# Patient Record
Sex: MALE | Race: BLACK OR AFRICAN AMERICAN | NOT HISPANIC OR LATINO | Employment: OTHER | URBAN - METROPOLITAN AREA
[De-identification: names, ages, dates, MRNs, and addresses within clinical notes are randomized per-mention and may not be internally consistent; named-entity substitution may affect disease eponyms.]

---

## 2023-11-12 ENCOUNTER — CLINICAL SUPPORT (OUTPATIENT)
Dept: EMERGENCY MEDICINE | Facility: HOSPITAL | Age: 58
End: 2023-11-12
Payer: COMMERCIAL

## 2023-11-12 ENCOUNTER — APPOINTMENT (OUTPATIENT)
Dept: RADIOLOGY | Facility: HOSPITAL | Age: 58
End: 2023-11-12
Payer: COMMERCIAL

## 2023-11-12 ENCOUNTER — HOSPITAL ENCOUNTER (EMERGENCY)
Facility: HOSPITAL | Age: 58
Discharge: HOME | End: 2023-11-12
Attending: EMERGENCY MEDICINE
Payer: COMMERCIAL

## 2023-11-12 VITALS
RESPIRATION RATE: 20 BRPM | OXYGEN SATURATION: 98 % | SYSTOLIC BLOOD PRESSURE: 118 MMHG | DIASTOLIC BLOOD PRESSURE: 81 MMHG | TEMPERATURE: 99 F | HEART RATE: 94 BPM

## 2023-11-12 DIAGNOSIS — R07.9 CHEST PAIN, UNSPECIFIED TYPE: Primary | ICD-10-CM

## 2023-11-12 LAB
ALBUMIN SERPL BCP-MCNC: 4 G/DL (ref 3.4–5)
ALP SERPL-CCNC: 71 U/L (ref 33–120)
ALT SERPL W P-5'-P-CCNC: 25 U/L (ref 10–52)
ANION GAP SERPL CALC-SCNC: 12 MMOL/L (ref 10–20)
AST SERPL W P-5'-P-CCNC: 24 U/L (ref 9–39)
ATRIAL RATE: 99 BPM
BASOPHILS # BLD AUTO: 0.02 X10*3/UL (ref 0–0.1)
BASOPHILS NFR BLD AUTO: 0.3 %
BILIRUB SERPL-MCNC: 0.7 MG/DL (ref 0–1.2)
BUN SERPL-MCNC: 17 MG/DL (ref 6–23)
CALCIUM SERPL-MCNC: 9.1 MG/DL (ref 8.6–10.6)
CARDIAC TROPONIN I PNL SERPL HS: 15 NG/L (ref 0–53)
CARDIAC TROPONIN I PNL SERPL HS: 19 NG/L (ref 0–53)
CHLORIDE SERPL-SCNC: 105 MMOL/L (ref 98–107)
CO2 SERPL-SCNC: 26 MMOL/L (ref 21–32)
CREAT SERPL-MCNC: 0.89 MG/DL (ref 0.5–1.3)
EOSINOPHIL # BLD AUTO: 0.03 X10*3/UL (ref 0–0.7)
EOSINOPHIL NFR BLD AUTO: 0.5 %
ERYTHROCYTE [DISTWIDTH] IN BLOOD BY AUTOMATED COUNT: 12.9 % (ref 11.5–14.5)
GFR SERPL CREATININE-BSD FRML MDRD: >90 ML/MIN/1.73M*2
GLUCOSE SERPL-MCNC: 93 MG/DL (ref 74–99)
HCT VFR BLD AUTO: 42.2 % (ref 41–52)
HGB BLD-MCNC: 14.1 G/DL (ref 13.5–17.5)
IMM GRANULOCYTES # BLD AUTO: 0.03 X10*3/UL (ref 0–0.7)
IMM GRANULOCYTES NFR BLD AUTO: 0.5 % (ref 0–0.9)
LIPASE SERPL-CCNC: 27 U/L (ref 9–82)
LYMPHOCYTES # BLD AUTO: 1.44 X10*3/UL (ref 1.2–4.8)
LYMPHOCYTES NFR BLD AUTO: 22.5 %
MCH RBC QN AUTO: 33.2 PG (ref 26–34)
MCHC RBC AUTO-ENTMCNC: 33.4 G/DL (ref 32–36)
MCV RBC AUTO: 99 FL (ref 80–100)
MONOCYTES # BLD AUTO: 0.5 X10*3/UL (ref 0.1–1)
MONOCYTES NFR BLD AUTO: 7.8 %
NEUTROPHILS # BLD AUTO: 4.37 X10*3/UL (ref 1.2–7.7)
NEUTROPHILS NFR BLD AUTO: 68.4 %
NRBC BLD-RTO: 0 /100 WBCS (ref 0–0)
P AXIS: 72 DEGREES
P OFFSET: 207 MS
P ONSET: 150 MS
PLATELET # BLD AUTO: 96 X10*3/UL (ref 150–450)
PLATELET CLUMP BLD QL SMEAR: PRESENT
POTASSIUM SERPL-SCNC: 3.8 MMOL/L (ref 3.5–5.3)
PR INTERVAL: 154 MS
PROT SERPL-MCNC: 7.1 G/DL (ref 6.4–8.2)
Q ONSET: 227 MS
QRS COUNT: 17 BEATS
QRS DURATION: 82 MS
QT INTERVAL: 346 MS
QTC CALCULATION(BAZETT): 444 MS
QTC FREDERICIA: 408 MS
R AXIS: -29 DEGREES
RBC # BLD AUTO: 4.25 X10*6/UL (ref 4.5–5.9)
RBC MORPH BLD: NORMAL
SODIUM SERPL-SCNC: 139 MMOL/L (ref 136–145)
T AXIS: -5 DEGREES
T OFFSET: 400 MS
VENTRICULAR RATE: 99 BPM
WBC # BLD AUTO: 6.4 X10*3/UL (ref 4.4–11.3)

## 2023-11-12 PROCEDURE — 80053 COMPREHEN METABOLIC PANEL: CPT

## 2023-11-12 PROCEDURE — 71046 X-RAY EXAM CHEST 2 VIEWS: CPT | Mod: FY

## 2023-11-12 PROCEDURE — 36415 COLL VENOUS BLD VENIPUNCTURE: CPT

## 2023-11-12 PROCEDURE — 36415 COLL VENOUS BLD VENIPUNCTURE: CPT | Performed by: EMERGENCY MEDICINE

## 2023-11-12 PROCEDURE — 83690 ASSAY OF LIPASE: CPT

## 2023-11-12 PROCEDURE — 99285 EMERGENCY DEPT VISIT HI MDM: CPT | Mod: 25 | Performed by: EMERGENCY MEDICINE

## 2023-11-12 PROCEDURE — 2500000001 HC RX 250 WO HCPCS SELF ADMINISTERED DRUGS (ALT 637 FOR MEDICARE OP): Mod: SE

## 2023-11-12 PROCEDURE — 94640 AIRWAY INHALATION TREATMENT: CPT

## 2023-11-12 PROCEDURE — 85025 COMPLETE CBC W/AUTO DIFF WBC: CPT

## 2023-11-12 PROCEDURE — 99283 EMERGENCY DEPT VISIT LOW MDM: CPT | Mod: 25

## 2023-11-12 PROCEDURE — 84484 ASSAY OF TROPONIN QUANT: CPT | Mod: 59 | Performed by: EMERGENCY MEDICINE

## 2023-11-12 PROCEDURE — 93005 ELECTROCARDIOGRAM TRACING: CPT

## 2023-11-12 PROCEDURE — 84484 ASSAY OF TROPONIN QUANT: CPT

## 2023-11-12 PROCEDURE — 2500000002 HC RX 250 W HCPCS SELF ADMINISTERED DRUGS (ALT 637 FOR MEDICARE OP, ALT 636 FOR OP/ED): Mod: SE

## 2023-11-12 PROCEDURE — 71046 X-RAY EXAM CHEST 2 VIEWS: CPT | Performed by: RADIOLOGY

## 2023-11-12 PROCEDURE — 99285 EMERGENCY DEPT VISIT HI MDM: CPT | Performed by: EMERGENCY MEDICINE

## 2023-11-12 RX ORDER — NAPROXEN SODIUM 220 MG/1
324 TABLET, FILM COATED ORAL ONCE
Status: DISCONTINUED | OUTPATIENT
Start: 2023-11-12 | End: 2023-11-12 | Stop reason: HOSPADM

## 2023-11-12 RX ORDER — ASPIRIN 325 MG
TABLET ORAL
Status: COMPLETED
Start: 2023-11-12 | End: 2023-11-12

## 2023-11-12 RX ORDER — IPRATROPIUM BROMIDE AND ALBUTEROL SULFATE 2.5; .5 MG/3ML; MG/3ML
3 SOLUTION RESPIRATORY (INHALATION) ONCE
Status: COMPLETED | OUTPATIENT
Start: 2023-11-12 | End: 2023-11-12

## 2023-11-12 RX ADMIN — IPRATROPIUM BROMIDE AND ALBUTEROL SULFATE 3 ML: .5; 3 SOLUTION RESPIRATORY (INHALATION) at 01:42

## 2023-11-12 RX ADMIN — ASPIRIN 325 MG ORAL TABLET: 325 PILL ORAL at 01:50

## 2023-11-12 ASSESSMENT — COLUMBIA-SUICIDE SEVERITY RATING SCALE - C-SSRS
1. IN THE PAST MONTH, HAVE YOU WISHED YOU WERE DEAD OR WISHED YOU COULD GO TO SLEEP AND NOT WAKE UP?: NO
6. HAVE YOU EVER DONE ANYTHING, STARTED TO DO ANYTHING, OR PREPARED TO DO ANYTHING TO END YOUR LIFE?: NO
2. HAVE YOU ACTUALLY HAD ANY THOUGHTS OF KILLING YOURSELF?: NO

## 2023-11-12 ASSESSMENT — PAIN - FUNCTIONAL ASSESSMENT: PAIN_FUNCTIONAL_ASSESSMENT: 0-10

## 2023-11-12 ASSESSMENT — PAIN DESCRIPTION - LOCATION: LOCATION: CHEST

## 2023-11-12 ASSESSMENT — PAIN SCALES - GENERAL: PAINLEVEL_OUTOF10: 8

## 2023-11-12 ASSESSMENT — PAIN DESCRIPTION - DESCRIPTORS: DESCRIPTORS: SHARP

## 2023-11-12 NOTE — ED NOTES
Pt states that he developed sharp chest pain after smoking crack and PCP     Frank Blair RN  11/12/23 6640

## 2023-11-12 NOTE — ED PROVIDER NOTES
HPI   Chief Complaint   Patient presents with    Chest Pain     Pt admits to using crack and wet. Reports chest pain for about 1 hour       Patient is a 58-year-old male with substance use disorder presenting to the emergency department with chest pain and shortness of breath.  Patient endorsed snorting crack cocaine followed by smoking 2 different cigarettes dipped in wet to make PCP.  Shortly after patient developed chest pain and difficulty breathing.  Is endorsing chest pain that radiates into his left arm.  Denies headache, vision changes, difficulty breathing, but does have a sensation like he cannot breathe.  Denies nausea, vomiting or diarrhea.  No abdominal pain.  Has no other acute complaints.      No trauma, falls, or injuries. No passing out. No dizziness, neck pain, urinary symptoms, numbness, fevers, or chills. He reports that he had felt fine prior to using the cocaine and smoking the cigarettes.                    Cosme Coma Scale Score: 15                  Patient History   No past medical history on file.  No past surgical history on file.  No family history on file.  Social History     Tobacco Use    Smoking status: Not on file    Smokeless tobacco: Not on file   Substance Use Topics    Alcohol use: Not on file    Drug use: Not on file       Physical Exam   ED Triage Vitals [11/12/23 0121]   Temp Heart Rate Resp BP   37.2 °C (99 °F) 94 20 118/81      SpO2 Temp Source Heart Rate Source Patient Position   98 % Temporal -- --      BP Location FiO2 (%)     -- --       Physical Exam  Vitals and nursing note reviewed.   Constitutional:       General: He is not in acute distress.     Appearance: Normal appearance. He is not toxic-appearing.   HENT:      Head: Normocephalic.      Mouth/Throat:      Mouth: Mucous membranes are moist.   Eyes:      Conjunctiva/sclera: Conjunctivae normal.      Pupils: Pupils are equal, round, and reactive to light.   Cardiovascular:      Rate and Rhythm: Normal rate and  regular rhythm.      Pulses:           Radial pulses are 2+ on the right side and 2+ on the left side.   Pulmonary:      Effort: Pulmonary effort is normal. No respiratory distress.      Breath sounds: Normal breath sounds.   Abdominal:      General: Abdomen is flat.      Palpations: Abdomen is soft.      Tenderness: There is no abdominal tenderness. There is no guarding or rebound.   Musculoskeletal:      Cervical back: Normal range of motion and neck supple.      Right lower leg: No edema.      Left lower leg: No edema.   Skin:     General: Skin is warm.   Neurological:      Mental Status: He is alert and oriented to person, place, and time.   Psychiatric:         Mood and Affect: Mood normal.         ED Course & MDM        Medical Decision Making  58-year-old male presenting to the emergency department with chest pain and shortness of breath in the setting of multisubstance use.  EKG ordered shows borderline sinus tachycardia with a rate of 99.  No T wave abnormalities, W[place], QRS and QT intervals within normal limits. No evidence of ST elevation or depressions. Patient has a reassuring physical exam with equal pulses in the upper and lower extremities.  He is endorsing pain but is hemodynamically stable.  Satting 98% on room air.  No pain radiating into his back, so low concern for ACS versus dissection at this time however he could be experiencing cocaine induced vasospasm..  Given patient is engaged in high risk behavior, chest pain work-up initiated and chest x-ray ordered.  Also provided patient with a breathing treatment to help with his shortness of breath.  Patient is requesting food which we will provide.  Patient will likely require reevaluation for clinical sobriety prior to safe discharge.  Patient pending results from work-up at the time of signout, patient signed out to incoming resident, refer to their note for further hospital course and final disposition.        Procedure  Procedures     Janet  DO Deanna  Resident  11/12/23 0144       Susanna Mcfarland MD  11/12/23 3987

## 2023-11-12 NOTE — PROGRESS NOTES
Handoff Note    I received Yogesh Ho in signout from Dr. Huerta.  Please see the previous note for all HPI, PE and MDM up to the time of signout at 0200.    In brief Yogesh Ho is an 58 y.o. male presenting for   Chief Complaint   Patient presents with    Chest Pain     Pt admits to using crack and wet. Reports chest pain for about 1 hour   .      At the time of signout, the patient's disposition is pending most of his work-up.  The patient presented with chest pain and shortness of breath after multisubstance use.  EKG was obtained and was nonischemic.  Labs were obtained.  There are no electrolyte disturbances on metabolic panel.  Troponin is negative x2.  No leukocytosis or anemia on CBC.  Chest x-ray was obtained and shows no acute cardiopulmonary process.  Patient was allowed time to rest and metabolize drugs.  He was treated with breathing treatment for shortness of breath and aspirin for his chest pain and reports improvement upon repeat evaluation.  Patient slept comfortably for multiple hours. He reports feeling better upon repeat evaluation. He tolerated po challenge and is ambulatory with steady gait. At this time he is appropriate for discharge home.  Patient was offered but declined [facility] services. I recommended follow-up with a primary care doctor and provided referral to the [facility] as needed.  Patient expresses understanding and agreed with this plan. The patient was informed of the results. The patient felt comfortable being discharged home. The patient was instructed of supportive measures and to follow-up with a primary care physician. Return precautions were provided, for which the patient expressed understanding. The patient was discharged home in stable condition. They should feel free to return to the Emergency Department at any time should their condition worsen or should they have any questions or concerns.       Throughout the ED stay, the patient was monitored and  re-examined for any changes in stability or symptomatology.  The patient was instructed to return to the ED if any symptoms recurred, worsened, or if there was any additional concerns.    ED Course:   Diagnoses as of 11/12/23 0536   Chest pain, unspecified type         Patient seen by and discussed with Dr. Mcfarland    Pt Disposition: Discharge    Procedures      Cheng Enriquez DO  Emergency Medicine PGY-2  [facility]

## 2024-01-23 ENCOUNTER — CONTACT MOVED (OUTPATIENT)
Age: 59
End: 2024-01-23
Payer: COMMERCIAL

## 2024-01-23 PROBLEM — R07.9 CHEST PAIN: Status: ACTIVE | Noted: 2024-01-23

## 2024-01-24 ENCOUNTER — CONTACT MOVED (OUTPATIENT)
Age: 59
End: 2024-01-24
Payer: COMMERCIAL

## 2024-01-25 ENCOUNTER — HOSPITAL ENCOUNTER (OUTPATIENT)
Dept: CARDIOLOGY | Facility: HOSPITAL | Age: 59
Discharge: HOME | End: 2024-01-25
Payer: COMMERCIAL

## 2024-01-25 ENCOUNTER — CONTACT MOVED (OUTPATIENT)
Age: 59
End: 2024-01-25
Payer: COMMERCIAL

## 2024-01-25 DIAGNOSIS — I21.4 NSTEMI (NON-ST ELEVATED MYOCARDIAL INFARCTION) (MULTI): ICD-10-CM

## 2024-01-25 DIAGNOSIS — R07.9 CHEST PAIN, UNSPECIFIED: ICD-10-CM

## 2024-01-25 PROCEDURE — 93306 TTE W/DOPPLER COMPLETE: CPT

## 2024-01-25 PROCEDURE — 93306 TTE W/DOPPLER COMPLETE: CPT | Performed by: INTERNAL MEDICINE

## 2024-01-26 PROBLEM — R07.9 CHEST PAIN: Status: RESOLVED | Noted: 2024-01-23 | Resolved: 2024-01-26

## 2024-03-16 ENCOUNTER — APPOINTMENT (OUTPATIENT)
Dept: CARDIOLOGY | Facility: HOSPITAL | Age: 59
End: 2024-03-16
Payer: COMMERCIAL

## 2024-03-16 ENCOUNTER — HOSPITAL ENCOUNTER (OUTPATIENT)
Facility: HOSPITAL | Age: 59
Setting detail: OBSERVATION
Discharge: HOME | End: 2024-03-17
Attending: STUDENT IN AN ORGANIZED HEALTH CARE EDUCATION/TRAINING PROGRAM | Admitting: HOSPITALIST
Payer: COMMERCIAL

## 2024-03-16 ENCOUNTER — CLINICAL SUPPORT (OUTPATIENT)
Dept: EMERGENCY MEDICINE | Facility: HOSPITAL | Age: 59
End: 2024-03-16
Payer: COMMERCIAL

## 2024-03-16 ENCOUNTER — APPOINTMENT (OUTPATIENT)
Dept: RADIOLOGY | Facility: HOSPITAL | Age: 59
End: 2024-03-16
Payer: COMMERCIAL

## 2024-03-16 DIAGNOSIS — I10 PRIMARY HYPERTENSION: ICD-10-CM

## 2024-03-16 DIAGNOSIS — R07.9 ACUTE CHEST PAIN: ICD-10-CM

## 2024-03-16 DIAGNOSIS — I20.1 CORONARY ARTERY VASOSPASM (CMS-HCC): Primary | ICD-10-CM

## 2024-03-16 PROBLEM — I20.0 UNSTABLE ANGINA (MULTI): Status: ACTIVE | Noted: 2024-03-16

## 2024-03-16 PROBLEM — F14.10 COCAINE ABUSE (MULTI): Status: ACTIVE | Noted: 2024-03-16

## 2024-03-16 PROBLEM — D69.6 THROMBOCYTOPENIA (CMS-HCC): Status: ACTIVE | Noted: 2024-03-16

## 2024-03-16 LAB
ABO GROUP (TYPE) IN BLOOD: NORMAL
ALBUMIN SERPL BCP-MCNC: 4 G/DL (ref 3.4–5)
ALBUMIN SERPL BCP-MCNC: 4.6 G/DL (ref 3.4–5)
ALP SERPL-CCNC: 80 U/L (ref 33–136)
ALP SERPL-CCNC: 94 U/L (ref 33–136)
ALT SERPL W P-5'-P-CCNC: 29 U/L (ref 10–52)
ALT SERPL W P-5'-P-CCNC: 33 U/L (ref 10–52)
AMPHETAMINES UR QL SCN: ABNORMAL
ANION GAP SERPL CALC-SCNC: 12 MMOL/L (ref 10–20)
ANION GAP SERPL CALC-SCNC: 13 MMOL/L (ref 10–20)
ANTIBODY SCREEN: NORMAL
AORTIC VALVE PEAK VELOCITY: 1.28 M/S
APTT PPP: 183 SECONDS (ref 27–38)
AST SERPL W P-5'-P-CCNC: 26 U/L (ref 9–39)
AST SERPL W P-5'-P-CCNC: 29 U/L (ref 9–39)
ATRIAL RATE: 80 BPM
ATRIAL RATE: 83 BPM
ATRIAL RATE: 88 BPM
AV PEAK GRADIENT: 6.6 MMHG
AVA (PEAK VEL): 3.09 CM2
BARBITURATES UR QL SCN: ABNORMAL
BASOPHILS # BLD AUTO: 0.02 X10*3/UL (ref 0–0.1)
BASOPHILS # BLD AUTO: 0.03 X10*3/UL (ref 0–0.1)
BASOPHILS NFR BLD AUTO: 0.5 %
BASOPHILS NFR BLD AUTO: 0.7 %
BENZODIAZ UR QL SCN: ABNORMAL
BILIRUB SERPL-MCNC: 0.7 MG/DL (ref 0–1.2)
BILIRUB SERPL-MCNC: 0.8 MG/DL (ref 0–1.2)
BNP SERPL-MCNC: 18 PG/ML (ref 0–99)
BUN SERPL-MCNC: 19 MG/DL (ref 6–23)
BUN SERPL-MCNC: 20 MG/DL (ref 6–23)
BZE UR QL SCN: ABNORMAL
CALCIUM SERPL-MCNC: 9.1 MG/DL (ref 8.6–10.6)
CALCIUM SERPL-MCNC: 9.8 MG/DL (ref 8.6–10.6)
CANNABINOIDS UR QL SCN: ABNORMAL
CARDIAC TROPONIN I PNL SERPL HS: 27 NG/L (ref 0–53)
CARDIAC TROPONIN I PNL SERPL HS: 27 NG/L (ref 0–53)
CARDIAC TROPONIN I PNL SERPL HS: 32 NG/L (ref 0–53)
CHLORIDE SERPL-SCNC: 100 MMOL/L (ref 98–107)
CHLORIDE SERPL-SCNC: 103 MMOL/L (ref 98–107)
CHOLEST SERPL-MCNC: 142 MG/DL (ref 0–199)
CHOLESTEROL/HDL RATIO: 2.7
CO2 SERPL-SCNC: 30 MMOL/L (ref 21–32)
CO2 SERPL-SCNC: 31 MMOL/L (ref 21–32)
CREAT SERPL-MCNC: 1.08 MG/DL (ref 0.5–1.3)
CREAT SERPL-MCNC: 1.18 MG/DL (ref 0.5–1.3)
EGFRCR SERPLBLD CKD-EPI 2021: 71 ML/MIN/1.73M*2
EGFRCR SERPLBLD CKD-EPI 2021: 79 ML/MIN/1.73M*2
EJECTION FRACTION APICAL 4 CHAMBER: 65.6
EJECTION FRACTION: 62 %
EOSINOPHIL # BLD AUTO: 0.01 X10*3/UL (ref 0–0.7)
EOSINOPHIL # BLD AUTO: 0.04 X10*3/UL (ref 0–0.7)
EOSINOPHIL NFR BLD AUTO: 0.3 %
EOSINOPHIL NFR BLD AUTO: 1 %
ERYTHROCYTE [DISTWIDTH] IN BLOOD BY AUTOMATED COUNT: 11.9 % (ref 11.5–14.5)
ERYTHROCYTE [DISTWIDTH] IN BLOOD BY AUTOMATED COUNT: 12.2 % (ref 11.5–14.5)
EST. AVERAGE GLUCOSE BLD GHB EST-MCNC: 80 MG/DL
FENTANYL+NORFENTANYL UR QL SCN: ABNORMAL
GLUCOSE SERPL-MCNC: 77 MG/DL (ref 74–99)
GLUCOSE SERPL-MCNC: 87 MG/DL (ref 74–99)
HBA1C MFR BLD: 4.4 %
HCT VFR BLD AUTO: 41.3 % (ref 41–52)
HCT VFR BLD AUTO: 45.7 % (ref 41–52)
HDLC SERPL-MCNC: 52.2 MG/DL
HGB BLD-MCNC: 14 G/DL (ref 13.5–17.5)
HGB BLD-MCNC: 15.4 G/DL (ref 13.5–17.5)
IMM GRANULOCYTES # BLD AUTO: 0.01 X10*3/UL (ref 0–0.7)
IMM GRANULOCYTES # BLD AUTO: 0.01 X10*3/UL (ref 0–0.7)
IMM GRANULOCYTES NFR BLD AUTO: 0.2 % (ref 0–0.9)
IMM GRANULOCYTES NFR BLD AUTO: 0.3 % (ref 0–0.9)
INR PPP: 1.1 (ref 0.9–1.1)
LDLC SERPL CALC-MCNC: 81 MG/DL
LEFT ATRIUM VOLUME AREA LENGTH INDEX BSA: 38.2 ML/M2
LEFT VENTRICLE INTERNAL DIMENSION DIASTOLE: 5 CM (ref 3.5–6)
LEFT VENTRICULAR OUTFLOW TRACT DIAMETER: 2.2 CM
LYMPHOCYTES # BLD AUTO: 1.11 X10*3/UL (ref 1.2–4.8)
LYMPHOCYTES # BLD AUTO: 1.35 X10*3/UL (ref 1.2–4.8)
LYMPHOCYTES NFR BLD AUTO: 27.8 %
LYMPHOCYTES NFR BLD AUTO: 32.9 %
MAGNESIUM SERPL-MCNC: 1.78 MG/DL (ref 1.6–2.4)
MCH RBC QN AUTO: 33 PG (ref 26–34)
MCH RBC QN AUTO: 33.3 PG (ref 26–34)
MCHC RBC AUTO-ENTMCNC: 33.7 G/DL (ref 32–36)
MCHC RBC AUTO-ENTMCNC: 33.9 G/DL (ref 32–36)
MCV RBC AUTO: 97 FL (ref 80–100)
MCV RBC AUTO: 99 FL (ref 80–100)
METHADONE UR QL SCN: ABNORMAL
MITRAL VALVE E/A RATIO: 0.77
MONOCYTES # BLD AUTO: 0.31 X10*3/UL (ref 0.1–1)
MONOCYTES # BLD AUTO: 0.43 X10*3/UL (ref 0.1–1)
MONOCYTES NFR BLD AUTO: 10.5 %
MONOCYTES NFR BLD AUTO: 7.8 %
NEUTROPHILS # BLD AUTO: 2.24 X10*3/UL (ref 1.2–7.7)
NEUTROPHILS # BLD AUTO: 2.54 X10*3/UL (ref 1.2–7.7)
NEUTROPHILS NFR BLD AUTO: 54.7 %
NEUTROPHILS NFR BLD AUTO: 63.3 %
NON HDL CHOLESTEROL: 90 MG/DL (ref 0–149)
NRBC BLD-RTO: 0 /100 WBCS (ref 0–0)
NRBC BLD-RTO: 0 /100 WBCS (ref 0–0)
OPIATES UR QL SCN: ABNORMAL
OXYCODONE+OXYMORPHONE UR QL SCN: ABNORMAL
P AXIS: 63 DEGREES
P AXIS: 67 DEGREES
P AXIS: 72 DEGREES
P OFFSET: 193 MS
P OFFSET: 199 MS
P OFFSET: 208 MS
P ONSET: 134 MS
P ONSET: 147 MS
P ONSET: 153 MS
PCP UR QL SCN: ABNORMAL
PHOSPHATE SERPL-MCNC: 3.9 MG/DL (ref 2.5–4.9)
PLATELET # BLD AUTO: 122 X10*3/UL (ref 150–450)
PLATELET # BLD AUTO: 124 X10*3/UL (ref 150–450)
POTASSIUM SERPL-SCNC: 3.5 MMOL/L (ref 3.5–5.3)
POTASSIUM SERPL-SCNC: 3.6 MMOL/L (ref 3.5–5.3)
PR INTERVAL: 146 MS
PR INTERVAL: 158 MS
PR INTERVAL: 158 MS
PROT SERPL-MCNC: 6.5 G/DL (ref 6.4–8.2)
PROT SERPL-MCNC: 7.9 G/DL (ref 6.4–8.2)
PROTHROMBIN TIME: 12.6 SECONDS (ref 9.8–12.8)
Q ONSET: 213 MS
Q ONSET: 226 MS
Q ONSET: 226 MS
QRS COUNT: 13 BEATS
QRS COUNT: 14 BEATS
QRS COUNT: 15 BEATS
QRS DURATION: 102 MS
QRS DURATION: 84 MS
QRS DURATION: 86 MS
QT INTERVAL: 394 MS
QT INTERVAL: 402 MS
QT INTERVAL: 404 MS
QTC CALCULATION(BAZETT): 462 MS
QTC CALCULATION(BAZETT): 463 MS
QTC CALCULATION(BAZETT): 488 MS
QTC FREDERICIA: 439 MS
QTC FREDERICIA: 442 MS
QTC FREDERICIA: 459 MS
R AXIS: -20 DEGREES
R AXIS: -28 DEGREES
R AXIS: -39 DEGREES
RBC # BLD AUTO: 4.24 X10*6/UL (ref 4.5–5.9)
RBC # BLD AUTO: 4.62 X10*6/UL (ref 4.5–5.9)
RH FACTOR (ANTIGEN D): NORMAL
RIGHT VENTRICLE FREE WALL PEAK S': 15.1 CM/S
SODIUM SERPL-SCNC: 140 MMOL/L (ref 136–145)
SODIUM SERPL-SCNC: 141 MMOL/L (ref 136–145)
T AXIS: 248 DEGREES
T AXIS: 249 DEGREES
T AXIS: 256 DEGREES
T OFFSET: 410 MS
T OFFSET: 427 MS
T OFFSET: 428 MS
TRICUSPID ANNULAR PLANE SYSTOLIC EXCURSION: 2.4 CM
TRIGL SERPL-MCNC: 43 MG/DL (ref 0–149)
UFH PPP CHRO-ACNC: 0.1 IU/ML
UFH PPP CHRO-ACNC: 0.1 IU/ML
UFH PPP CHRO-ACNC: 0.2 IU/ML
UFH PPP CHRO-ACNC: 0.6 IU/ML
VENTRICULAR RATE: 80 BPM
VENTRICULAR RATE: 83 BPM
VENTRICULAR RATE: 88 BPM
VLDL: 9 MG/DL (ref 0–40)
WBC # BLD AUTO: 4 X10*3/UL (ref 4.4–11.3)
WBC # BLD AUTO: 4.1 X10*3/UL (ref 4.4–11.3)

## 2024-03-16 PROCEDURE — 85520 HEPARIN ASSAY: CPT

## 2024-03-16 PROCEDURE — 80307 DRUG TEST PRSMV CHEM ANLYZR: CPT

## 2024-03-16 PROCEDURE — 80053 COMPREHEN METABOLIC PANEL: CPT | Performed by: EMERGENCY MEDICINE

## 2024-03-16 PROCEDURE — 76937 US GUIDE VASCULAR ACCESS: CPT

## 2024-03-16 PROCEDURE — 36415 COLL VENOUS BLD VENIPUNCTURE: CPT | Performed by: EMERGENCY MEDICINE

## 2024-03-16 PROCEDURE — 85025 COMPLETE CBC W/AUTO DIFF WBC: CPT

## 2024-03-16 PROCEDURE — 2500000002 HC RX 250 W HCPCS SELF ADMINISTERED DRUGS (ALT 637 FOR MEDICARE OP, ALT 636 FOR OP/ED): Mod: SE

## 2024-03-16 PROCEDURE — 83036 HEMOGLOBIN GLYCOSYLATED A1C: CPT

## 2024-03-16 PROCEDURE — 86850 RBC ANTIBODY SCREEN: CPT

## 2024-03-16 PROCEDURE — 2500000004 HC RX 250 GENERAL PHARMACY W/ HCPCS (ALT 636 FOR OP/ED): Mod: SE

## 2024-03-16 PROCEDURE — 93005 ELECTROCARDIOGRAM TRACING: CPT

## 2024-03-16 PROCEDURE — G0378 HOSPITAL OBSERVATION PER HR: HCPCS

## 2024-03-16 PROCEDURE — 71046 X-RAY EXAM CHEST 2 VIEWS: CPT | Performed by: RADIOLOGY

## 2024-03-16 PROCEDURE — 2500000004 HC RX 250 GENERAL PHARMACY W/ HCPCS (ALT 636 FOR OP/ED)

## 2024-03-16 PROCEDURE — 83880 ASSAY OF NATRIURETIC PEPTIDE: CPT

## 2024-03-16 PROCEDURE — 37799 UNLISTED PX VASCULAR SURGERY: CPT

## 2024-03-16 PROCEDURE — 80061 LIPID PANEL: CPT

## 2024-03-16 PROCEDURE — 93306 TTE W/DOPPLER COMPLETE: CPT | Performed by: INTERNAL MEDICINE

## 2024-03-16 PROCEDURE — 84484 ASSAY OF TROPONIN QUANT: CPT

## 2024-03-16 PROCEDURE — 96376 TX/PRO/DX INJ SAME DRUG ADON: CPT

## 2024-03-16 PROCEDURE — 36415 COLL VENOUS BLD VENIPUNCTURE: CPT | Performed by: STUDENT IN AN ORGANIZED HEALTH CARE EDUCATION/TRAINING PROGRAM

## 2024-03-16 PROCEDURE — 85610 PROTHROMBIN TIME: CPT

## 2024-03-16 PROCEDURE — 71046 X-RAY EXAM CHEST 2 VIEWS: CPT

## 2024-03-16 PROCEDURE — 84100 ASSAY OF PHOSPHORUS: CPT

## 2024-03-16 PROCEDURE — 84484 ASSAY OF TROPONIN QUANT: CPT | Performed by: EMERGENCY MEDICINE

## 2024-03-16 PROCEDURE — 96366 THER/PROPH/DIAG IV INF ADDON: CPT

## 2024-03-16 PROCEDURE — 93306 TTE W/DOPPLER COMPLETE: CPT

## 2024-03-16 PROCEDURE — 80053 COMPREHEN METABOLIC PANEL: CPT | Performed by: STUDENT IN AN ORGANIZED HEALTH CARE EDUCATION/TRAINING PROGRAM

## 2024-03-16 PROCEDURE — 2020000001 HC ICU ROOM DAILY

## 2024-03-16 PROCEDURE — 99291 CRITICAL CARE FIRST HOUR: CPT

## 2024-03-16 PROCEDURE — 83735 ASSAY OF MAGNESIUM: CPT

## 2024-03-16 PROCEDURE — 80053 COMPREHEN METABOLIC PANEL: CPT

## 2024-03-16 PROCEDURE — 99285 EMERGENCY DEPT VISIT HI MDM: CPT | Mod: 25

## 2024-03-16 PROCEDURE — 85025 COMPLETE CBC W/AUTO DIFF WBC: CPT | Performed by: EMERGENCY MEDICINE

## 2024-03-16 PROCEDURE — 2500000001 HC RX 250 WO HCPCS SELF ADMINISTERED DRUGS (ALT 637 FOR MEDICARE OP): Mod: SE

## 2024-03-16 PROCEDURE — 96374 THER/PROPH/DIAG INJ IV PUSH: CPT

## 2024-03-16 PROCEDURE — 2500000001 HC RX 250 WO HCPCS SELF ADMINISTERED DRUGS (ALT 637 FOR MEDICARE OP)

## 2024-03-16 PROCEDURE — 96365 THER/PROPH/DIAG IV INF INIT: CPT

## 2024-03-16 PROCEDURE — 85025 COMPLETE CBC W/AUTO DIFF WBC: CPT | Performed by: STUDENT IN AN ORGANIZED HEALTH CARE EDUCATION/TRAINING PROGRAM

## 2024-03-16 PROCEDURE — 84484 ASSAY OF TROPONIN QUANT: CPT | Performed by: STUDENT IN AN ORGANIZED HEALTH CARE EDUCATION/TRAINING PROGRAM

## 2024-03-16 RX ORDER — HEPARIN SODIUM 5000 [USP'U]/ML
INJECTION, SOLUTION INTRAVENOUS; SUBCUTANEOUS CODE/TRAUMA/SEDATION MEDICATION
Status: DISCONTINUED | OUTPATIENT
Start: 2024-03-16 | End: 2024-03-16

## 2024-03-16 RX ORDER — NAPROXEN SODIUM 220 MG/1
324 TABLET, FILM COATED ORAL ONCE
Status: COMPLETED | OUTPATIENT
Start: 2024-03-16 | End: 2024-03-16

## 2024-03-16 RX ORDER — HEPARIN SODIUM 5000 [USP'U]/ML
INJECTION, SOLUTION INTRAVENOUS; SUBCUTANEOUS
Status: COMPLETED
Start: 2024-03-16 | End: 2024-03-16

## 2024-03-16 RX ORDER — HEPARIN SODIUM 1000 [USP'U]/ML
4000 INJECTION, SOLUTION INTRAVENOUS; SUBCUTANEOUS ONCE
Status: DISCONTINUED | OUTPATIENT
Start: 2024-03-16 | End: 2024-03-16

## 2024-03-16 RX ORDER — ASPIRIN 81 MG/1
81 TABLET ORAL DAILY
Status: DISCONTINUED | OUTPATIENT
Start: 2024-03-17 | End: 2024-03-17 | Stop reason: HOSPADM

## 2024-03-16 RX ORDER — HEPARIN SODIUM 5000 [USP'U]/ML
4000 INJECTION, SOLUTION INTRAVENOUS; SUBCUTANEOUS ONCE
Status: DISCONTINUED | OUTPATIENT
Start: 2024-03-16 | End: 2024-03-16

## 2024-03-16 RX ORDER — NITROGLYCERIN 0.4 MG/1
0.4 TABLET SUBLINGUAL ONCE
Status: DISCONTINUED | OUTPATIENT
Start: 2024-03-16 | End: 2024-03-16

## 2024-03-16 RX ORDER — NITROGLYCERIN 0.4 MG/1
TABLET SUBLINGUAL CODE/TRAUMA/SEDATION MEDICATION
Status: DISCONTINUED | OUTPATIENT
Start: 2024-03-16 | End: 2024-03-16

## 2024-03-16 RX ORDER — HEPARIN SODIUM 10000 [USP'U]/100ML
0-4000 INJECTION, SOLUTION INTRAVENOUS CONTINUOUS
Status: DISCONTINUED | OUTPATIENT
Start: 2024-03-16 | End: 2024-03-17 | Stop reason: HOSPADM

## 2024-03-16 RX ORDER — DIAZEPAM 5 MG/1
5 TABLET ORAL ONCE
Status: COMPLETED | OUTPATIENT
Start: 2024-03-16 | End: 2024-03-16

## 2024-03-16 RX ORDER — HEPARIN SODIUM 5000 [USP'U]/ML
2000-4000 INJECTION, SOLUTION INTRAVENOUS; SUBCUTANEOUS EVERY 4 HOURS PRN
Status: DISCONTINUED | OUTPATIENT
Start: 2024-03-16 | End: 2024-03-17 | Stop reason: HOSPADM

## 2024-03-16 RX ORDER — DILTIAZEM HYDROCHLORIDE 30 MG/1
30 TABLET, FILM COATED ORAL EVERY 6 HOURS
Status: DISCONTINUED | OUTPATIENT
Start: 2024-03-16 | End: 2024-03-17 | Stop reason: HOSPADM

## 2024-03-16 RX ADMIN — ASPIRIN 81 MG CHEWABLE TABLET 324 MG: 81 TABLET CHEWABLE at 06:04

## 2024-03-16 RX ADMIN — TICAGRELOR 180 MG: 90 TABLET ORAL at 06:29

## 2024-03-16 RX ADMIN — DIAZEPAM 5 MG: 5 TABLET ORAL at 06:05

## 2024-03-16 RX ADMIN — DILTIAZEM HYDROCHLORIDE 30 MG: 30 TABLET ORAL at 11:40

## 2024-03-16 RX ADMIN — NITROGLYCERIN 0.4 MG: 0.4 TABLET SUBLINGUAL at 06:30

## 2024-03-16 RX ADMIN — DILTIAZEM HYDROCHLORIDE 30 MG: 30 TABLET ORAL at 17:05

## 2024-03-16 RX ADMIN — HEPARIN SODIUM 2000 UNITS: 5000 INJECTION INTRAVENOUS; SUBCUTANEOUS at 20:35

## 2024-03-16 RX ADMIN — HEPARIN SODIUM 4000 UNITS: 5000 INJECTION INTRAVENOUS; SUBCUTANEOUS at 06:29

## 2024-03-16 RX ADMIN — HEPARIN SODIUM 900 UNITS/HR: 10000 INJECTION, SOLUTION INTRAVENOUS at 06:44

## 2024-03-16 SDOH — HEALTH STABILITY: MENTAL HEALTH: SUICIDE ASSESSMENT: ADULT (C-SSRS)

## 2024-03-16 SDOH — SOCIAL STABILITY: SOCIAL INSECURITY: DO YOU FEEL UNSAFE GOING BACK TO THE PLACE WHERE YOU ARE LIVING?: NO

## 2024-03-16 SDOH — SOCIAL STABILITY: SOCIAL INSECURITY: HAVE YOU HAD THOUGHTS OF HARMING ANYONE ELSE?: NO

## 2024-03-16 SDOH — HEALTH STABILITY: MENTAL HEALTH: HAVE YOU WISHED YOU WERE DEAD OR WISHED YOU COULD GO TO SLEEP AND NOT WAKE UP?: NO

## 2024-03-16 SDOH — HEALTH STABILITY: MENTAL HEALTH: HAVE YOU EVER DONE ANYTHING, STARTED TO DO ANYTHING, OR PREPARED TO DO ANYTHING TO END YOUR LIFE?: NO

## 2024-03-16 SDOH — SOCIAL STABILITY: SOCIAL INSECURITY: WERE YOU ABLE TO COMPLETE ALL THE BEHAVIORAL HEALTH SCREENINGS?: YES

## 2024-03-16 SDOH — SOCIAL STABILITY: SOCIAL INSECURITY: HAS ANYONE EVER THREATENED TO HURT YOUR FAMILY OR YOUR PETS?: NO

## 2024-03-16 SDOH — HEALTH STABILITY: MENTAL HEALTH: HAVE YOU ACTUALLY HAD ANY THOUGHTS OF KILLING YOURSELF?: NO

## 2024-03-16 SDOH — SOCIAL STABILITY: SOCIAL INSECURITY: ABUSE: ADULT

## 2024-03-16 SDOH — SOCIAL STABILITY: SOCIAL INSECURITY: ARE YOU OR HAVE YOU BEEN THREATENED OR ABUSED PHYSICALLY, EMOTIONALLY, OR SEXUALLY BY ANYONE?: NO

## 2024-03-16 SDOH — SOCIAL STABILITY: SOCIAL INSECURITY: DO YOU FEEL ANYONE HAS EXPLOITED OR TAKEN ADVANTAGE OF YOU FINANCIALLY OR OF YOUR PERSONAL PROPERTY?: NO

## 2024-03-16 SDOH — SOCIAL STABILITY: SOCIAL INSECURITY: ARE THERE ANY APPARENT SIGNS OF INJURIES/BEHAVIORS THAT COULD BE RELATED TO ABUSE/NEGLECT?: NO

## 2024-03-16 SDOH — SOCIAL STABILITY: SOCIAL INSECURITY: DOES ANYONE TRY TO KEEP YOU FROM HAVING/CONTACTING OTHER FRIENDS OR DOING THINGS OUTSIDE YOUR HOME?: NO

## 2024-03-16 ASSESSMENT — LIFESTYLE VARIABLES
AUDIT-C TOTAL SCORE: 0
SKIP TO QUESTIONS 9-10: 1
HOW OFTEN DO YOU HAVE 6 OR MORE DRINKS ON ONE OCCASION: NEVER
HOW OFTEN DO YOU HAVE A DRINK CONTAINING ALCOHOL: NEVER
AUDIT-C TOTAL SCORE: 0
HOW MANY STANDARD DRINKS CONTAINING ALCOHOL DO YOU HAVE ON A TYPICAL DAY: PATIENT DOES NOT DRINK

## 2024-03-16 ASSESSMENT — COGNITIVE AND FUNCTIONAL STATUS - GENERAL
DAILY ACTIVITIY SCORE: 24
MOBILITY SCORE: 24
DAILY ACTIVITIY SCORE: 24
MOBILITY SCORE: 24
PATIENT BASELINE BEDBOUND: NO

## 2024-03-16 ASSESSMENT — ACTIVITIES OF DAILY LIVING (ADL)
ADEQUATE_TO_COMPLETE_ADL: YES
WALKS IN HOME: INDEPENDENT
LACK_OF_TRANSPORTATION: NO
GROOMING: INDEPENDENT
JUDGMENT_ADEQUATE_SAFELY_COMPLETE_DAILY_ACTIVITIES: YES
DRESSING YOURSELF: INDEPENDENT
BATHING: INDEPENDENT
TOILETING: INDEPENDENT
PATIENT'S MEMORY ADEQUATE TO SAFELY COMPLETE DAILY ACTIVITIES?: YES
HEARING - RIGHT EAR: FUNCTIONAL
HEARING - LEFT EAR: FUNCTIONAL
FEEDING YOURSELF: INDEPENDENT

## 2024-03-16 ASSESSMENT — PAIN SCALES - GENERAL
PAINLEVEL_OUTOF10: 0 - NO PAIN
PAINLEVEL_OUTOF10: 5 - MODERATE PAIN
PAINLEVEL_OUTOF10: 0 - NO PAIN
PAINLEVEL_OUTOF10: 7

## 2024-03-16 ASSESSMENT — COLUMBIA-SUICIDE SEVERITY RATING SCALE - C-SSRS
6. HAVE YOU EVER DONE ANYTHING, STARTED TO DO ANYTHING, OR PREPARED TO DO ANYTHING TO END YOUR LIFE?: NO
2. HAVE YOU ACTUALLY HAD ANY THOUGHTS OF KILLING YOURSELF?: NO
1. IN THE PAST MONTH, HAVE YOU WISHED YOU WERE DEAD OR WISHED YOU COULD GO TO SLEEP AND NOT WAKE UP?: NO

## 2024-03-16 ASSESSMENT — PAIN - FUNCTIONAL ASSESSMENT
PAIN_FUNCTIONAL_ASSESSMENT: 0-10

## 2024-03-16 ASSESSMENT — PATIENT HEALTH QUESTIONNAIRE - PHQ9
2. FEELING DOWN, DEPRESSED OR HOPELESS: NOT AT ALL
1. LITTLE INTEREST OR PLEASURE IN DOING THINGS: NOT AT ALL
SUM OF ALL RESPONSES TO PHQ9 QUESTIONS 1 & 2: 0

## 2024-03-16 ASSESSMENT — PAIN DESCRIPTION - DESCRIPTORS
DESCRIPTORS: SHARP
DESCRIPTORS: SHARP

## 2024-03-16 NOTE — ED TRIAGE NOTES
Pt presented with c/o midsternal CP that started 1 hour ago, pt states that he was smoking crack all day, pt denies any cardiac history does have history of HTN

## 2024-03-16 NOTE — H&P
History Of Present Illness  Yogesh Ho is a 59 y.o. male with a PMHX of HTN and cocaine abuse who presented to the  ED 3/16 with complaints of chest pain. He had been smoking crack last night up until 0300 when he began have chest pain that he described as sharp in nature located on the left side of his chest. Initially had told the ED that it was continuous for over an hour but later say it would come and go with the longest it was lasting being ~30 mins. Denied radiation of the pain in the ED but on arrival to the CICU stated that when the pain came on it would radiate down his left arm and into his left leg with associated numbness. Denied any radiation to the back or sharp tearing pain. Denied any SOB, nausea, vomiting, diaphoresis, lightheadedness, dizziness, syncope, edema in LE or any other acute concerns.   Initial troponin was 27 with the rest of the CMP and CBC being unremarkable. An initial EKG showed some non-specific T wave inversions in II, III, Avf, V5 and V6. Repeat EKGs did show some dynamic changes in anterolateral leads that were not consistent with STEMI. He was loaded with ASA and Brilinta and started on a Heparin gtt and admitted to the CICU for further management.      On arrival he was in no acute distress satting well on RA. He states the chest pain is sharp in nature and comes and goes with the longest it last being 30 minutes. Also has some repoducibility on exam. More recently the chest pain is sharp and last a few seconds. States it will occassional radiate down his left shoulder and left leg but is not currently having pain on exam. Pain rated as an 8/10 in terms of severity at its worst. No other acute concerns at this time.      Past Medical History  No past medical history on file.    Surgical History  No past surgical history on file.     Social History  He reports that he has been smoking cigarettes. He has been smoking an average of .5 packs per day. He has never used  "smokeless tobacco. He reports that he does not currently use alcohol. He reports current drug use. Drugs: Cocaine and \"Crack\" cocaine.    Family History  No family history on file.     Allergies  Patient has no known allergies.    Physical Exam  GENERAL:  In no acute distress  NEUROLOGIC:  A and O x 3. Cranial nerves 2 through 12 grossly intact with no gait disturbances. Intact motor and sensory systems, with normal reflexes and coordination.    HEENT:  Pupils are equal, round, and reactive to light and accommodation. Extraocular motion intact.    MOUTH: MMM, poor dentition   CARDIAC: S1 + S2, RRR, no M/R/G.  Left chest wall tenderness   LUNGS: CTA BL.  No wheezes or coarse breath sounds. Symmetric respirations. No increased work of breathing.   ABDOMEN: Soft, non-tender to palpation. No organomegaly. Normal BS in 4Q, No rebound or guarding.  EXTREMITIES:  Moving x4 equally and spontaneously with no joint warmth or swelling.    SKIN: Clean, warm, dry, and intact with normal skin turgor.  PSYCH: Appropriate mood and behavior.    Last Recorded Vitals  Blood pressure 126/75, pulse 92, temperature 36.4 °C (97.5 °F), temperature source Temporal, resp. rate 22, height 1.753 m (5' 9\"), weight 72.6 kg (160 lb), SpO2 100 %.    Relevant Results  Scheduled medications  [START ON 3/17/2024] aspirin, 81 mg, oral, Daily  dilTIAZem, 30 mg, oral, q6h      Continuous medications  heparin, 0-4,000 Units/hr, Last Rate: 900 Units/hr (03/16/24 1200)      PRN medications  PRN medications: heparin  Results for orders placed or performed during the hospital encounter of 03/16/24 (from the past 24 hour(s))   ECG 12 lead   Result Value Ref Range    Ventricular Rate 83 BPM    Atrial Rate 83 BPM    MI Interval 158 ms    QRS Duration 102 ms    QT Interval 394 ms    QTC Calculation(Bazett) 462 ms    P Axis 67 degrees    R Axis -28 degrees    T Axis 256 degrees    QRS Count 14 beats    Q Onset 213 ms    P Onset 134 ms    P Offset 193 ms    T " Offset 410 ms    QTC Fredericia 439 ms   CBC and Auto Differential   Result Value Ref Range    WBC 4.0 (L) 4.4 - 11.3 x10*3/uL    nRBC 0.0 0.0 - 0.0 /100 WBCs    RBC 4.62 4.50 - 5.90 x10*6/uL    Hemoglobin 15.4 13.5 - 17.5 g/dL    Hematocrit 45.7 41.0 - 52.0 %    MCV 99 80 - 100 fL    MCH 33.3 26.0 - 34.0 pg    MCHC 33.7 32.0 - 36.0 g/dL    RDW 12.2 11.5 - 14.5 %    Platelets 122 (L) 150 - 450 x10*3/uL    Neutrophils % 63.3 40.0 - 80.0 %    Immature Granulocytes %, Automated 0.3 0.0 - 0.9 %    Lymphocytes % 27.8 13.0 - 44.0 %    Monocytes % 7.8 2.0 - 10.0 %    Eosinophils % 0.3 0.0 - 6.0 %    Basophils % 0.5 0.0 - 2.0 %    Neutrophils Absolute 2.54 1.20 - 7.70 x10*3/uL    Immature Granulocytes Absolute, Automated 0.01 0.00 - 0.70 x10*3/uL    Lymphocytes Absolute 1.11 (L) 1.20 - 4.80 x10*3/uL    Monocytes Absolute 0.31 0.10 - 1.00 x10*3/uL    Eosinophils Absolute 0.01 0.00 - 0.70 x10*3/uL    Basophils Absolute 0.02 0.00 - 0.10 x10*3/uL   Comprehensive metabolic panel   Result Value Ref Range    Glucose 77 74 - 99 mg/dL    Sodium 140 136 - 145 mmol/L    Potassium 3.5 3.5 - 5.3 mmol/L    Chloride 100 98 - 107 mmol/L    Bicarbonate 31 21 - 32 mmol/L    Anion Gap 13 10 - 20 mmol/L    Urea Nitrogen 20 6 - 23 mg/dL    Creatinine 1.18 0.50 - 1.30 mg/dL    eGFR 71 >60 mL/min/1.73m*2    Calcium 9.8 8.6 - 10.6 mg/dL    Albumin 4.6 3.4 - 5.0 g/dL    Alkaline Phosphatase 94 33 - 136 U/L    Total Protein 7.9 6.4 - 8.2 g/dL    AST 29 9 - 39 U/L    Bilirubin, Total 0.7 0.0 - 1.2 mg/dL    ALT 33 10 - 52 U/L   Troponin I, High Sensitivity   Result Value Ref Range    Troponin I, High Sensitivity 27 0 - 53 ng/L   Lipid panel   Result Value Ref Range    Cholesterol 142 0 - 199 mg/dL    HDL-Cholesterol 52.2 mg/dL    Cholesterol/HDL Ratio 2.7     LDL Calculated 81 <=99 mg/dL    VLDL 9 0 - 40 mg/dL    Triglycerides 43 0 - 149 mg/dL    Non HDL Cholesterol 90 0 - 149 mg/dL   Hemoglobin A1c   Result Value Ref Range    Hemoglobin A1C 4.4 see  below %    Estimated Average Glucose 80 Not Established mg/dL   Troponin I, High Sensitivity   Result Value Ref Range    Troponin I, High Sensitivity 32 0 - 53 ng/L   Heparin Assay, UFH   Result Value Ref Range    Heparin Unfractionated 0.2 See Comment Below for Therapeutic Ranges IU/mL   Heparin Assay, UFH   Result Value Ref Range    Heparin Unfractionated 0.6 See Comment Below for Therapeutic Ranges IU/mL   CBC and Auto Differential   Result Value Ref Range    WBC 4.1 (L) 4.4 - 11.3 x10*3/uL    nRBC 0.0 0.0 - 0.0 /100 WBCs    RBC 4.24 (L) 4.50 - 5.90 x10*6/uL    Hemoglobin 14.0 13.5 - 17.5 g/dL    Hematocrit 41.3 41.0 - 52.0 %    MCV 97 80 - 100 fL    MCH 33.0 26.0 - 34.0 pg    MCHC 33.9 32.0 - 36.0 g/dL    RDW 11.9 11.5 - 14.5 %    Platelets 124 (L) 150 - 450 x10*3/uL    Neutrophils % 54.7 40.0 - 80.0 %    Immature Granulocytes %, Automated 0.2 0.0 - 0.9 %    Lymphocytes % 32.9 13.0 - 44.0 %    Monocytes % 10.5 2.0 - 10.0 %    Eosinophils % 1.0 0.0 - 6.0 %    Basophils % 0.7 0.0 - 2.0 %    Neutrophils Absolute 2.24 1.20 - 7.70 x10*3/uL    Immature Granulocytes Absolute, Automated 0.01 0.00 - 0.70 x10*3/uL    Lymphocytes Absolute 1.35 1.20 - 4.80 x10*3/uL    Monocytes Absolute 0.43 0.10 - 1.00 x10*3/uL    Eosinophils Absolute 0.04 0.00 - 0.70 x10*3/uL    Basophils Absolute 0.03 0.00 - 0.10 x10*3/uL   Comprehensive metabolic panel   Result Value Ref Range    Glucose 87 74 - 99 mg/dL    Sodium 141 136 - 145 mmol/L    Potassium 3.6 3.5 - 5.3 mmol/L    Chloride 103 98 - 107 mmol/L    Bicarbonate 30 21 - 32 mmol/L    Anion Gap 12 10 - 20 mmol/L    Urea Nitrogen 19 6 - 23 mg/dL    Creatinine 1.08 0.50 - 1.30 mg/dL    eGFR 79 >60 mL/min/1.73m*2    Calcium 9.1 8.6 - 10.6 mg/dL    Albumin 4.0 3.4 - 5.0 g/dL    Alkaline Phosphatase 80 33 - 136 U/L    Total Protein 6.5 6.4 - 8.2 g/dL    AST 26 9 - 39 U/L    Bilirubin, Total 0.8 0.0 - 1.2 mg/dL    ALT 29 10 - 52 U/L   Magnesium   Result Value Ref Range    Magnesium 1.78 1.60  - 2.40 mg/dL   Phosphorus   Result Value Ref Range    Phosphorus 3.9 2.5 - 4.9 mg/dL   Coagulation Screen   Result Value Ref Range    Protime 12.6 9.8 - 12.8 seconds    INR 1.1 0.9 - 1.1    aPTT 183 (HH) 27 - 38 seconds   Troponin I, High Sensitivity   Result Value Ref Range    Troponin I, High Sensitivity 27 0 - 53 ng/L     XR chest 2 views    Result Date: 3/16/2024  Interpreted By:  Rina Carranza and Afshari Mirak Sohrab STUDY: XR CHEST 2 VIEWS;  3/16/2024 3:24 am   INDICATION: Signs/Symptoms:chest pain.   COMPARISON: Chest x-ray 01/23/2024   ACCESSION NUMBER(S): [id number]   ORDERING CLINICIAN: SUSIE MUNSON   FINDINGS: PA and lateral radiographs of the chest were provided.   Stable 5 mm round metallic foreign body in the right anterior lower neck.   CARDIOMEDIASTINAL SILHOUETTE: Cardiomediastinal silhouette is normal in size and configuration.   LUNGS: No consolidation, pleural effusion or pneumothorax.   ABDOMEN: No remarkable upper abdominal findings.   BONES: No acute osseous changes.       1.  No acute cardiopulmonary process.   I personally reviewed the images/study and I agree with the findings as stated by resident physician Dr. Devaughn Valentino . This study was interpreted at [place], [place].   MACRO: None   Signed by: Rina Carranza 3/16/2024 4:08 AM Dictation workstation:   [id number]    ECG 12 lead    Result Date: 3/16/2024  Normal sinus rhythm Minimal voltage criteria for LVH, may be normal variant ( Nate product ) T wave abnormality, consider inferolateral ischemia Prolonged QT Abnormal ECG When compared with ECG of 25-JAN-2024 09:39, T wave inversion more evident in Inferior leads T wave inversion more evident in Lateral leads See ED provider note for full interpretation and clinical correlation Confirmed by Stacey Castillo ([id number]) on 3/16/2024 2:16:07 AM        Assessment/Plan   Principal Problem:    Coronary artery vasospasm (CMS/HCC)  Active  Problems:    HTN (hypertension)    Thrombocytopenia (CMS/HCC)    Cocaine abuse (CMS/HCC)    Mr. Ho is a 59 year old male with a PMHX of HTN and cocaine abuse who presented to the  ED 3/16 with complaints of chest pain. Troponin has been negative x3 but had some dynamic EKG changes not consistent with STEMI. Admitted to CICU for further management of unstable angina.     #CV  #Unstable Angina   #HTN  - ACS likely 2/2 coronary artery vasospasm iso of cocaine abuse   - Troponin negative x3  - EKG with dynamic changes not consistent with STEMI  - Will treat as ACS, continue Heparin gtt for 48 hours  - Was loaded with ASA and Brilinta  - Will continue 81 ASA   - lipid panel and A1c for risk factor modification   - Will start Diltiazem 30 mg q6h for BP, HR control as well for coronary vasospasm   - Would likely benefit from ischemic evaluation (CTA coronary)- during admission   - TTE ordered   - Will check pm EKG to ensure no further dynamic changes on EKG     F: PRN  E: PRN  N: Regular diet  A: PIVs  DVT ppx: On Heparin gtt  GI ppx: N/A  Code Status: Full Code, confirmed on arrival to CICU  NOK: Sister    Patient seen and staffed with attending during rounds.     Malcolm Wadsworth MD

## 2024-03-16 NOTE — PROGRESS NOTES
Pharmacy Medication History Review    Yogesh Ho is a 59 y.o. male admitted for Unstable angina (CMS/[facility]). Pharmacy reviewed the patient's [id number] medications and allergies for accuracy.        The list below reflects the updated PTA list. Comments regarding how patient may be taking medications differently can be found in the Admit Orders Activity  None        The list below reflects the updated allergy list. Please review each documented allergy for additional clarification and justification.  Allergies  Reviewed by Wilber Rodriguez MD on 3/16/2024   No Known Allergies         Patient accepts M2B at discharge.     Sources used to complete the med history include out patient fill history, OARRS, and patient interview.      Below are additional concerns with the patient's PTA list.  The patient stated he currently does not take any medications.     Sam Corona [facility]  Transitions of Care Clinical Pharmacist  Please reach out via Epic Chat for questions, if no response call  [phone] or [facility]

## 2024-03-16 NOTE — CARE PLAN
Problem: Pain  Goal: My pain/discomfort is manageable  Outcome: Progressing  Flowsheets (Taken 3/16/2024 1833)  Resident's pain/discomfort is manageable:   Include resident/family/caregiver in decisions related to pain management   Offer non-pharmacological pain management interventions   Administer pain medication prior to activities that may trigger pain   Identify and avoid pain triggers     Problem: Safety  Goal: Patient will be injury free during hospitalization  Outcome: Progressing  Goal: I will remain free of falls  Outcome: Progressing  Flowsheets (Taken 3/16/2024 1833)  Resident will remain free of falls:   Utilize fall response kit   Assist with toileting as orderd   Maintain bed at position as ordered (chair height, low bed)   Consider transfer to room close to nurses' station   Visual checks per facility policy   Accompany resident as ordered (ex. 1:1, stand-by assist, dayroom monitoring, 15 minute checks, line of sight)   Use gait belt for all transfers   Apply bed/chair alarms as appropriate     Problem: Daily Care  Goal: Daily care needs are met  Outcome: Progressing  Flowsheets (Taken 3/16/2024 1833)  Daily care needs are met:   Assist patient with activities of daily living as needed   Assess and monitor ability to perform self care and identify potential discharge needs   Include patient/family/caregiver in decisions related to daily care   Encourage independent activity per ability   Assess skin integrity/risk for skin breakdown     Problem: Psychosocial Needs  Goal: Demonstrates ability to cope with hospitalization/illness  Outcome: Progressing  Flowsheets (Taken 3/16/2024 1833)  Demonstrates ability to cope with hospitalization/illness:   Assist resident to identify and practice own strengths and abilities   Encourage verbalization of feelings/concerns/expectations   Encourage participation in diversional activities   Reinforce positive adaptation of new coping behaviors   Encourage resident  to set and complete small goals for self   Provide low-stimulation environment as needed  Goal: Collaborate with me, my family, and caregiver to identify my specific goals  Outcome: Progressing  Flowsheets (Taken 3/16/2024 1418 by Lilian Gallo, RN)  Cultural Requests During Hospitalization: None  Spiritual Requests During Hospitalization: None     Problem: Discharge Barriers  Goal: My discharge needs are met  Outcome: Progressing  Flowsheets (Taken 3/16/2024 1833)  Resident's discharge needs are met:   Identify potential discharge barriers on admission and throughout stay   Involve resident/family/caregiver in discharge planning process     Problem: Skin  Goal: Decreased wound size/increased tissue granulation at next dressing change  Outcome: Progressing  Flowsheets (Taken 3/16/2024 1833)  Decreased wound size/increased tissue granulation at next dressing change:   Promote sleep for wound healing   Utilize specialty bed per algorithm   Protective dressings over bony prominences  Goal: Participates in plan/prevention/treatment measures  Outcome: Progressing  Flowsheets (Taken 3/16/2024 1833)  Participates in plan/prevention/treatment measures:   Discuss with provider PT/OT consult   Elevate heels   Increase activity/out of bed for meals  Goal: Prevent/manage excess moisture  Outcome: Progressing  Flowsheets (Taken 3/16/2024 1833)  Prevent/manage excess moisture:   Follow provider orders for dressing changes   Cleanse incontinence/protect with barrier cream   Moisturize dry skin  Goal: Prevent/minimize sheer/friction injuries  Outcome: Progressing  Flowsheets (Taken 3/16/2024 1833)  Prevent/minimize sheer/friction injuries:   Increase activity/out of bed for meals   Utilize specialty bed per algorithm   HOB 30 degrees or less   Turn/reposition every 2 hours/use positioning/transfer devices   Complete micro-shifts as needed if patient unable. Adjust patient position to relieve pressure points, not a full  turn      The clinical goals for the shift include Pt will remain hemodynamically stable throughout the shift.

## 2024-03-16 NOTE — ED PROVIDER NOTES
"CC: Chest Pain     HPI:  This is a 59-year-old male with past medical history of hypertension and substance use disorder with cocaine who presents to the emergency department with chest pain.  Patient states that he smoked a lot of crack cocaine last night until about 3 AM.  States that he then began having chest pain.  States that the chest pain is located on the left side of his chest.  Denies any significant radiation.  Does complain of back pain.  Denies any significant shortness of breath.  Denies any fevers chills, nausea or vomiting.  Denies any abdominal pain.    Limitations to history: None  Independent historian(s): None  Records Reviewed: Recent available ED and inpatient notes reviewed in EMR.    PMHx/PSHx:  Per HPI.   - has no past medical history on file.  - has no past surgical history on file.    Medications:  Reviewed in EMR. See EMR for complete list of medications and doses.    Allergies:  Patient has no known allergies.    Social History:  - Tobacco:  reports that he has been smoking cigarettes. He has been smoking an average of .5 packs per day. He has never used smokeless tobacco.   - Alcohol:  reports that he does not currently use alcohol.   - Illicit Drugs:  reports current drug use. Drugs: Cocaine and \"Crack\" cocaine.     ROS:  Per HPI.       ???????????????????????????????????????????????????????????????  Triage Vitals:  T 36 °C (96.8 °F)  HR 87  BP (!) 146/98  RR 18  O2 97 %      Physical Exam    General: Patient sitting comfortably in a chair, no acute distress, breathing easily, appropriately conversational without confusion or mental status changes.  Head: Normocephalic. Atraumatic.  Neck:  FROM. No gross masses.   Eyes: EOMI. No scleral icterus or injection.  ENT: Moist mucous membranes, no apparent trauma or lesions.  CV: Regular rhythm. No murmurs, rubs, gallops appreciated. 2+ radial pulses bilaterally.  Resp: Clear to auscultation bilaterally. No respiratory distress.   GI: " Soft, non-distended.  No tenderness with palpation.    EXT: No peripheral edema, contusions, or wounds.  Skin: Warm and dry, no rashes or lesions.  Neuro: Alert and oriented.  No focal neurological deficits.  Motor and sensation intact throughout.  Speech fluent.    ???????????????????????????????????????????????????????????????  Labs:   Labs Reviewed   CBC WITH AUTO DIFFERENTIAL - Abnormal       Result Value    WBC 4.0 (*)     nRBC 0.0      RBC 4.62      Hemoglobin 15.4      Hematocrit 45.7      MCV 99      MCH 33.3      MCHC 33.7      RDW 12.2      Platelets 122 (*)     Neutrophils % 63.3      Immature Granulocytes %, Automated 0.3      Lymphocytes % 27.8      Monocytes % 7.8      Eosinophils % 0.3      Basophils % 0.5      Neutrophils Absolute 2.54      Immature Granulocytes Absolute, Automated 0.01      Lymphocytes Absolute 1.11 (*)     Monocytes Absolute 0.31      Eosinophils Absolute 0.01      Basophils Absolute 0.02     COMPREHENSIVE METABOLIC PANEL - Normal    Glucose 77      Sodium 140      Potassium 3.5      Chloride 100      Bicarbonate 31      Anion Gap 13      Urea Nitrogen 20      Creatinine 1.18      eGFR 71      Calcium 9.8      Albumin 4.6      Alkaline Phosphatase 94      Total Protein 7.9      AST 29      Bilirubin, Total 0.7      ALT 33     TROPONIN I, HIGH SENSITIVITY - Normal    Troponin I, High Sensitivity 27      Narrative:     Less than 99th percentile of normal range cutoff-  Female and children under 18 years old <35 ng/L; Male <54 ng/L: Negative  Repeat testing should be performed if clinically indicated.     Female and children under 18 years old  ng/L; Male  ng/L:  Consistent with possible cardiac damage and possible increased clinical   risk. Serial measurements may help to assess extent of myocardial damage.     >120 ng/L: Consistent with cardiac damage, increased clinical risk and  myocardial infarction. Serial measurements may help assess extent of   myocardial damage.       NOTE: Children less than 1 year old may have higher baseline troponin   levels and results should be interpreted in conjunction with the overall   clinical context.    NOTE: Troponin I testing is performed using a different   testing methodology at [facility] than at other   system [facility]. Direct result comparisons should only   be made within the same method.     TROPONIN I, HIGH SENSITIVITY - Normal    Troponin I, High Sensitivity 32      Narrative:     Less than 99th percentile of normal range cutoff-  Female and children under 18 years old <35 ng/L; Male <54 ng/L: Negative  Repeat testing should be performed if clinically indicated.     Female and children under 18 years old  ng/L; Male  ng/L:  Consistent with possible cardiac damage and possible increased clinical   risk. Serial measurements may help to assess extent of myocardial damage.     >120 ng/L: Consistent with cardiac damage, increased clinical risk and  myocardial infarction. Serial measurements may help assess extent of   myocardial damage.      NOTE: Children less than 1 year old may have higher baseline troponin   levels and results should be interpreted in conjunction with the overall   clinical context.    NOTE: Troponin I testing is performed using a different   testing methodology at [facility] than at other   [facility]. Direct result comparisons should only   be made within the same method.     HEPARIN ASSAY   HEPARIN ASSAY        Imaging:   XR chest 2 views   Final Result   1.  No acute cardiopulmonary process.        I personally reviewed the images/study and I agree with the findings   as stated by resident physician Dr. Devaughn Valentino . This study   was interpreted at [facility],   [place], [place].        MACRO:   None        Signed by: Rina Carranza 3/16/2024 4:08 AM   Dictation workstation:   [id number]      Point of Care Ultrasound     (Results Pending)        EK: 23: Rate of 83 bpm, sinus rhythm, left axis, normal intervals, no significant ST elevations or depressions, new T wave abnormalities noted in leads V5 and V6 as well as T wave inversions in leads II, III and aVF.  Lateral T wave inversions new when compared EKG obtained 2024.    06: 06: Rate of 80 bpm, sinus rhythm, left axis, normal intervals, ST elevations noted in leads V3 with continued T wave inversions in V4 through V6 that progressed compared to prior EKG.  T wave inversions in leads II, III and aVF persist.    06: 34: Rate of 89 bpm, regular rhythm, left axis, QT interval of 554, other intervals are normal, no ST elevations however Wellens morphology present in leads V2 and V3 with deep inverted T waves in the lateral leads of V4 through V6.    06: 37: Rate of 84 bpm, regular rhythm, QTc of 510, other intervals are normal, T wave inversions now noted from V2 through V6 with dynamic changes and in V5 and V6 compared to prior EKG.    06: 39: Rate of 86 bpm, regular rhythm, left axis, normal intervals, no ST elevations or depressions, similar appearance to T wave inversions in leads V2 through V4 with minor T wave inversions in V5 and resolution of T wave inversions in V6.    08: 02: Rate of 88 bpm, regular rhythm, left axis, normal intervals, no ST segment elevations or depressions, T wave inversions persist most prominently in V3 through V5 with minor inversions in V2 and V6.    MDM:  This is a 59-year-old male with past medical history of substance use disorder with cocaine as well as hypertension who presents to the emergency department with chest pain.  He is hemodynamically stable and in no acute distress upon arrival.  Vital signs initially significant for blood pressure of 146/98 however other vitals are normal.  He is not tachycardic.  He is satting well on room air.  Clinically he is actually well-appearing and requesting food.  His physical exam is  largely unremarkable.  He has got equal pulses throughout.  Differential diagnosis considered for his presentation includes cocaine vasospasm versus acute coronary syndrome versus vascular aortic or coronary pathology.  The patient had labs obtained while waiting in the lobby that have returned by the time of evaluation.  There is a mild leukopenia and no anemia.  There is a mild thrombocytopenia.  Metabolic panel is unremarkable.  Initial troponin obtained is 27 and delta troponin is ordered.  Patient continued to complain of chest pain on my evaluation and therefore had a repeat EKG performed which showed progressive T wave inversions and new ST elevations.  With this a STEMI was activated.  The patient is loaded with aspirin and Brilinta and given nitroglycerin as well as Valium with thoughts that his chest pain and EKG changes are likely secondary to cocaine vasospasm.  A point-of-care ultrasound was obtained that showed no acute regional wall abnormalities.  Repeat troponin mildly uptrending into the 30s.  The cardiology fellow has come to bedside to evaluate the patient and agrees with canceling the STEMI at this time given no regional wall out abnormalities.  Additional EKGs are obtained throughout this.  The cardiology attending is informed of the patient and provides recommendations to continue with nitroglycerin as well as begin heparin.  Low intensity heparin protocol ordered.  Patient continues to be well-appearing with unremarkable vital signs.  EKG changes continue to show multiple dynamic changes and ST and T wave morphology.  Given his workup I do feel his chest pain and multiple EKG dynamic changes are secondary to cocaine induced vasospasm.  At this time cardiology will defer catheterization and is recommending admission to regular nursing floor..  Admissions coordinator is contacted for admission to cardiology telemetry floor.  At this time the patient will be handed off to the oncoming emergency  department provider pending acceptance by medicine.  Patient remained hemodynamically stable, calm, continued to have mild chest pain.    Patient seen by and discussed with the attending emergency medicine physician.     ED Course:  Diagnoses as of 03/16/24 0911   Coronary artery vasospasm (CMS/HCC)   Acute chest pain       Social Determinants Limiting Care:  None identified    Disposition:  Handoff    Cheng Enriquez DO   Emergency Medicine PGY-2  [facility]      Procedures ? SmartLinks last updated 3/16/2024 8:53 AM        Cheng Enriquez DO  Resident  03/16/24 0913

## 2024-03-17 VITALS
OXYGEN SATURATION: 100 % | RESPIRATION RATE: 10 BRPM | WEIGHT: 160.05 LBS | TEMPERATURE: 97.5 F | BODY MASS INDEX: 23.71 KG/M2 | HEIGHT: 69 IN | DIASTOLIC BLOOD PRESSURE: 90 MMHG | SYSTOLIC BLOOD PRESSURE: 149 MMHG | HEART RATE: 76 BPM

## 2024-03-17 LAB
ALBUMIN SERPL BCP-MCNC: 3.5 G/DL (ref 3.4–5)
ALP SERPL-CCNC: 96 U/L (ref 33–136)
ALT SERPL W P-5'-P-CCNC: 24 U/L (ref 10–52)
ANION GAP SERPL CALC-SCNC: 13 MMOL/L (ref 10–20)
AST SERPL W P-5'-P-CCNC: 20 U/L (ref 9–39)
BASOPHILS # BLD AUTO: 0.03 X10*3/UL (ref 0–0.1)
BASOPHILS NFR BLD AUTO: 0.7 %
BILIRUB SERPL-MCNC: 0.6 MG/DL (ref 0–1.2)
BUN SERPL-MCNC: 18 MG/DL (ref 6–23)
CALCIUM SERPL-MCNC: 9.4 MG/DL (ref 8.6–10.6)
CHLORIDE SERPL-SCNC: 104 MMOL/L (ref 98–107)
CO2 SERPL-SCNC: 28 MMOL/L (ref 21–32)
CREAT SERPL-MCNC: 1.06 MG/DL (ref 0.5–1.3)
EGFRCR SERPLBLD CKD-EPI 2021: 80 ML/MIN/1.73M*2
EOSINOPHIL # BLD AUTO: 0.03 X10*3/UL (ref 0–0.7)
EOSINOPHIL NFR BLD AUTO: 0.7 %
ERYTHROCYTE [DISTWIDTH] IN BLOOD BY AUTOMATED COUNT: 11.7 % (ref 11.5–14.5)
GLUCOSE SERPL-MCNC: 95 MG/DL (ref 74–99)
HCT VFR BLD AUTO: 37.9 % (ref 41–52)
HGB BLD-MCNC: 13.6 G/DL (ref 13.5–17.5)
IMM GRANULOCYTES # BLD AUTO: 0.01 X10*3/UL (ref 0–0.7)
IMM GRANULOCYTES NFR BLD AUTO: 0.2 % (ref 0–0.9)
LYMPHOCYTES # BLD AUTO: 1.58 X10*3/UL (ref 1.2–4.8)
LYMPHOCYTES NFR BLD AUTO: 38.8 %
MAGNESIUM SERPL-MCNC: 1.73 MG/DL (ref 1.6–2.4)
MCH RBC QN AUTO: 33.7 PG (ref 26–34)
MCHC RBC AUTO-ENTMCNC: 35.9 G/DL (ref 32–36)
MCV RBC AUTO: 94 FL (ref 80–100)
MONOCYTES # BLD AUTO: 0.41 X10*3/UL (ref 0.1–1)
MONOCYTES NFR BLD AUTO: 10.1 %
NEUTROPHILS # BLD AUTO: 2.01 X10*3/UL (ref 1.2–7.7)
NEUTROPHILS NFR BLD AUTO: 49.5 %
NRBC BLD-RTO: 0 /100 WBCS (ref 0–0)
PHOSPHATE SERPL-MCNC: 4 MG/DL (ref 2.5–4.9)
PLATELET # BLD AUTO: 121 X10*3/UL (ref 150–450)
POTASSIUM SERPL-SCNC: 4 MMOL/L (ref 3.5–5.3)
PROT SERPL-MCNC: 5.9 G/DL (ref 6.4–8.2)
RBC # BLD AUTO: 4.03 X10*6/UL (ref 4.5–5.9)
SODIUM SERPL-SCNC: 141 MMOL/L (ref 136–145)
UFH PPP CHRO-ACNC: 0.1 IU/ML
UFH PPP CHRO-ACNC: 0.2 IU/ML
UFH PPP CHRO-ACNC: 0.3 IU/ML
WBC # BLD AUTO: 4.1 X10*3/UL (ref 4.4–11.3)

## 2024-03-17 PROCEDURE — 2500000004 HC RX 250 GENERAL PHARMACY W/ HCPCS (ALT 636 FOR OP/ED)

## 2024-03-17 PROCEDURE — 82565 ASSAY OF CREATININE: CPT

## 2024-03-17 PROCEDURE — 99239 HOSP IP/OBS DSCHRG MGMT >30: CPT

## 2024-03-17 PROCEDURE — 84100 ASSAY OF PHOSPHORUS: CPT

## 2024-03-17 PROCEDURE — 36415 COLL VENOUS BLD VENIPUNCTURE: CPT

## 2024-03-17 PROCEDURE — 85025 COMPLETE CBC W/AUTO DIFF WBC: CPT

## 2024-03-17 PROCEDURE — 96366 THER/PROPH/DIAG IV INF ADDON: CPT

## 2024-03-17 PROCEDURE — 96365 THER/PROPH/DIAG IV INF INIT: CPT | Mod: 59

## 2024-03-17 PROCEDURE — G0378 HOSPITAL OBSERVATION PER HR: HCPCS

## 2024-03-17 PROCEDURE — 2500000001 HC RX 250 WO HCPCS SELF ADMINISTERED DRUGS (ALT 637 FOR MEDICARE OP)

## 2024-03-17 PROCEDURE — 85520 HEPARIN ASSAY: CPT

## 2024-03-17 PROCEDURE — 96376 TX/PRO/DX INJ SAME DRUG ADON: CPT

## 2024-03-17 PROCEDURE — 83735 ASSAY OF MAGNESIUM: CPT

## 2024-03-17 PROCEDURE — 96367 TX/PROPH/DG ADDL SEQ IV INF: CPT

## 2024-03-17 RX ORDER — ATORVASTATIN CALCIUM 80 MG/1
80 TABLET, FILM COATED ORAL NIGHTLY
Qty: 30 TABLET | Refills: 0 | Status: SHIPPED | OUTPATIENT
Start: 2024-03-17 | End: 2024-04-16

## 2024-03-17 RX ORDER — ATORVASTATIN CALCIUM 80 MG/1
80 TABLET, FILM COATED ORAL NIGHTLY
Status: DISCONTINUED | OUTPATIENT
Start: 2024-03-17 | End: 2024-03-17 | Stop reason: HOSPADM

## 2024-03-17 RX ORDER — ASPIRIN 81 MG/1
81 TABLET ORAL DAILY
Qty: 30 TABLET | Refills: 0 | Status: SHIPPED | OUTPATIENT
Start: 2024-03-18

## 2024-03-17 RX ORDER — CARVEDILOL 3.12 MG/1
3.12 TABLET ORAL
Qty: 60 TABLET | Refills: 0 | Status: SHIPPED | OUTPATIENT
Start: 2024-03-17

## 2024-03-17 RX ORDER — MAGNESIUM SULFATE HEPTAHYDRATE 40 MG/ML
2 INJECTION, SOLUTION INTRAVENOUS ONCE
Status: COMPLETED | OUTPATIENT
Start: 2024-03-17 | End: 2024-03-17

## 2024-03-17 RX ADMIN — HEPARIN SODIUM 1500 UNITS/HR: 10000 INJECTION, SOLUTION INTRAVENOUS at 04:00

## 2024-03-17 RX ADMIN — DILTIAZEM HYDROCHLORIDE 30 MG: 30 TABLET ORAL at 11:30

## 2024-03-17 RX ADMIN — ASPIRIN 81 MG: 81 TABLET, COATED ORAL at 08:49

## 2024-03-17 RX ADMIN — HEPARIN SODIUM 2000 UNITS: 5000 INJECTION INTRAVENOUS; SUBCUTANEOUS at 00:46

## 2024-03-17 RX ADMIN — MAGNESIUM SULFATE HEPTAHYDRATE 2 G: 40 INJECTION, SOLUTION INTRAVENOUS at 08:49

## 2024-03-17 RX ADMIN — DILTIAZEM HYDROCHLORIDE 30 MG: 30 TABLET ORAL at 00:08

## 2024-03-17 RX ADMIN — DILTIAZEM HYDROCHLORIDE 30 MG: 30 TABLET ORAL at 05:41

## 2024-03-17 ASSESSMENT — PAIN - FUNCTIONAL ASSESSMENT
PAIN_FUNCTIONAL_ASSESSMENT: 0-10

## 2024-03-17 ASSESSMENT — PAIN SCALES - GENERAL
PAINLEVEL_OUTOF10: 0 - NO PAIN

## 2024-03-17 NOTE — CARE PLAN
Problem: Pain  Goal: My pain/discomfort is manageable  Outcome: Progressing  Flowsheets (Taken 3/16/2024 1833)  Resident's pain/discomfort is manageable:   Include resident/family/caregiver in decisions related to pain management   Offer non-pharmacological pain management interventions   Administer pain medication prior to activities that may trigger pain   Identify and avoid pain triggers     Problem: Safety  Goal: Patient will be injury free during hospitalization  Outcome: Progressing  Goal: I will remain free of falls  Outcome: Progressing  Flowsheets (Taken 3/16/2024 1833)  Resident will remain free of falls:   Utilize fall response kit   Assist with toileting as orderd   Maintain bed at position as ordered (chair height, low bed)   Consider transfer to room close to nurses' station   Visual checks per facility policy   Accompany resident as ordered (ex. 1:1, stand-by assist, dayroom monitoring, 15 minute checks, line of sight)   Use gait belt for all transfers   Apply bed/chair alarms as appropriate     Problem: Daily Care  Goal: Daily care needs are met  Outcome: Progressing  Flowsheets (Taken 3/16/2024 1833)  Daily care needs are met:   Assist patient with activities of daily living as needed   Assess and monitor ability to perform self care and identify potential discharge needs   Include patient/family/caregiver in decisions related to daily care   Encourage independent activity per ability   Assess skin integrity/risk for skin breakdown     Problem: Skin  Goal: Decreased wound size/increased tissue granulation at next dressing change  Outcome: Progressing  Flowsheets (Taken 3/16/2024 1833)  Decreased wound size/increased tissue granulation at next dressing change:   Promote sleep for wound healing   Utilize specialty bed per algorithm   Protective dressings over bony prominences  Goal: Participates in plan/prevention/treatment measures  Outcome: Progressing  Flowsheets (Taken 3/16/2024  1833)  Participates in plan/prevention/treatment measures:   Discuss with provider PT/OT consult   Elevate heels   Increase activity/out of bed for meals  Goal: Prevent/manage excess moisture  Outcome: Progressing  Flowsheets (Taken 3/16/2024 1833)  Prevent/manage excess moisture:   Follow provider orders for dressing changes   Cleanse incontinence/protect with barrier cream   Moisturize dry skin    The clinical goals for the shift include Patient will remain safe and free of injury throughout the shift.

## 2024-03-17 NOTE — NURSING NOTE
1130 Patient disconnected his IV heparin and Mg and came out into the hallway and told RN that he wants to leave to go home. RN discussed to pt about the risks of leaving early without finishing treatment plan. Patient was threatening to leave AMA despite understanding the risks. RN escalated the concerns to the primary team, CICU and Dr. Archibald.   1145 After a shared decision making, the decision was made to discharge patient home. Patient did not want to wait for his medications to be delivered. RN printed the discharge instructions and explained them to patient. RN discussed the medications he needs to start taking at home and also told him to pick them up from [facility] pharmacy. Patient agreed.   1155 Patient was discharged.

## 2024-03-17 NOTE — DISCHARGE INSTRUCTIONS
Good afternoon Mr. Ho,     You were admitted to the CICU because you were having chest pain and had some changes on your EKG. You were treated with medications and observed in the CICU overnight. Your labs remained stable. You have a lot of calcium and plaque built up in the arteries of your heart. We started some medications called aspiring, atorvastatin and carvedilol to help your heart. We also placed a referral to Cardiology to see outpatient. The source of the chest pain is likely from the cocaine and we strongly suggest complete cessation. Please return to the ED if you experience any chest pain/pressure, SOB, nausea, vomiting, diaphoresis, lightheadedness, dizziness or any other new symptoms. Thank you for allowing us to participate in your care.     Sincerely,  The CICU team

## 2024-03-17 NOTE — DISCHARGE SUMMARY
Discharge Diagnosis  Unstable angina (CMS/HCC)    Issues Requiring Follow-Up  Follow up with Cardiology for further consideration of ischemic evaluation  - Continue management of CAD  - Follow up with PCP for BP management     Test Results Pending At Discharge  Pending Labs       Order Current Status    Heparin Assay, [facility] Collected (03/16/24 1530)            H&P   Yogesh Ho is a 59 y.o. male with a PMHX of HTN and cocaine abuse who presented to the  ED 3/16 with complaints of chest pain. He had been smoking crack last night up until 0300 when he began have chest pain that he described as sharp in nature located on the left side of his chest. Initially had told the ED that it was continuous for over an hour but later say it would come and go with the longest it was lasting being ~30 mins. Denied radiation of the pain in the ED but on arrival to the CICU stated that when the pain came on it would radiate down his left arm and into his left leg with associated numbness. Denied any radiation to the back or sharp tearing pain. Denied any SOB, nausea, vomiting, diaphoresis, lightheadedness, dizziness, syncope, edema in LE or any other acute concerns.   Initial troponin was 27 with the rest of the CMP and CBC being unremarkable. An initial EKG showed some non-specific T wave inversions in II, III, Avf, V5 and V6. Repeat EKGs did show some dynamic changes in anterolateral leads that were not consistent with STEMI. He was loaded with ASA and Brilinta and started on a Heparin gtt and admitted to the CICU for further management.       On arrival he was in no acute distress satting well on RA. He states the chest pain is sharp in nature and comes and goes with the longest it last being 30 minutes. Also has some repoducibility on exam. More recently the chest pain is sharp and last a few seconds. States it will occassional radiate down his left shoulder and left leg but is not currently having pain on exam. Pain rated as  an 8/10 in terms of severity at its worst. No other acute concerns at this time.     CICU course: He was admitted to the CICU and continued on Heparin gtt. Repeat EKG showed improvement of the dynamic ST-T changes and normal troponin. He was started on diltiazem for presumed cocaine induced vasospasm and had significant improvement in his chest pain. He was counseled at length about complete cessation from cocaine. Social work was consulted as the patient was initially interested in drug rehab but changed his mind and wanted to go home. He was threatening to leave AMA but after shared decision making the decision was made to discharge home. He will be discharged on Aspirin, atorvastatin and carvedilol and instructed to follow up with Cardiology and a PCP for further management of CAD and HTN.  He instructed that he has calcium and plaque buildup in his arteries around his heart and that we strongly recommend taking the medications as prescribed and following up with a cardiologist and PCP for further management. Patient in agreement to plan.       Pertinent Physical Exam At Time of Discharge  Physical Exam  GENERAL:  In no acute distress  NEUROLOGIC:  A and O x 3. Cranial nerves 2 through 12 grossly intact with no gait disturbances. Intact motor and sensory systems, with normal reflexes and coordination.    HEENT:  Pupils are equal, round, and reactive to light and accommodation. Extraocular motion intact.    MOUTH: MMM, poor dentition   CARDIAC: S1 + S2, RRR, no M/R/G.  Left chest wall tenderness   LUNGS: CTA BL.  No wheezes or coarse breath sounds. Symmetric respirations. No increased work of breathing.   ABDOMEN: Soft, non-tender to palpation. No organomegaly. Normal BS in 4Q, No rebound or guarding.  EXTREMITIES:  Moving x4 equally and spontaneously with no joint warmth or swelling.    SKIN: Clean, warm, dry, and intact with normal skin turgor.  PSYCH: Appropriate mood and behavior.  Home Medications      Medication List      START taking these medications     aspirin 81 mg EC tablet; Take 1 tablet (81 mg) by mouth once daily. Do   not start before March 18, 2024.; Start taking on: March 18, 2024   atorvastatin 80 mg tablet; Commonly known as: Lipitor; Take 1 tablet (80   mg) by mouth once daily at bedtime.   carvedilol 3.125 mg tablet; Commonly known as: Coreg; Take 1 tablet   (3.125 mg) by mouth 2 times a day with meals.       Outpatient Follow-Up  Referrals placed for cardiology and PCP     Malcolm Wadsworth MD

## 2024-03-18 ENCOUNTER — PATIENT OUTREACH (OUTPATIENT)
Dept: CARE COORDINATION | Facility: CLINIC | Age: 59
End: 2024-03-18
Payer: COMMERCIAL

## 2024-03-18 NOTE — PROGRESS NOTES
Outreach call to patient to support a smooth transition of care from recent admission. Elena Ho answered the phone and stated that her brother Mario Ho whose birthday is 3/23[birth year]1970 and is homeless uses her other brothers Yogesh Ho's info when he is admitted to the hospital. She said Mario is the one that was seen in the hospital under the pt's Yogesh Ho's identity. She stated that he does this every time he goes to the hospital and he put her number down as a .

## 2024-04-22 PROBLEM — I20.0 UNSTABLE ANGINA (MULTI): Status: RESOLVED | Noted: 2024-03-16 | Resolved: 2024-04-22

## 2024-04-22 PROBLEM — I10 HTN (HYPERTENSION): Status: RESOLVED | Noted: 2024-03-16 | Resolved: 2024-04-22

## 2024-04-22 PROBLEM — D69.6 THROMBOCYTOPENIA (CMS-HCC): Status: RESOLVED | Noted: 2024-03-16 | Resolved: 2024-04-22

## 2024-04-22 PROBLEM — I20.1 CORONARY ARTERY VASOSPASM (CMS-HCC): Status: RESOLVED | Noted: 2024-03-16 | Resolved: 2024-04-22

## 2024-04-22 PROBLEM — F14.10 COCAINE ABUSE (MULTI): Status: RESOLVED | Noted: 2024-03-16 | Resolved: 2024-04-22

## 2024-06-17 DIAGNOSIS — I21.4 NSTEMI (NON-ST ELEVATED MYOCARDIAL INFARCTION) (MULTI): Primary | ICD-10-CM

## 2024-06-17 LAB
AORTIC VALVE PEAK VELOCITY: 1.17 M/S
AV PEAK GRADIENT: 5.5 MMHG
AVA (PEAK VEL): 2.9 CM2
EJECTION FRACTION APICAL 4 CHAMBER: 34.9
LEFT ATRIUM VOLUME AREA LENGTH INDEX BSA: 38.1 ML/M2
LEFT VENTRICLE INTERNAL DIMENSION DIASTOLE: 5.5 CM (ref 3.5–6)
LEFT VENTRICULAR OUTFLOW TRACT DIAMETER: 2 CM
LV EJECTION FRACTION BIPLANE: 37 %
MITRAL VALVE E/A RATIO: 0.84
MITRAL VALVE E/E' RATIO: 12.11
RIGHT VENTRICLE FREE WALL PEAK S': 10.2 CM/S
TRICUSPID ANNULAR PLANE SYSTOLIC EXCURSION: 2 CM

## 2024-12-20 ENCOUNTER — HOSPITAL ENCOUNTER (EMERGENCY)
Facility: HOSPITAL | Age: 59
Discharge: LEFT BEFORE DISCHARGE | End: 2024-12-20
Payer: COMMERCIAL

## 2024-12-20 ENCOUNTER — CLINICAL SUPPORT (OUTPATIENT)
Dept: EMERGENCY MEDICINE | Facility: HOSPITAL | Age: 59
End: 2024-12-20
Payer: COMMERCIAL

## 2024-12-20 ENCOUNTER — APPOINTMENT (OUTPATIENT)
Dept: RADIOLOGY | Facility: HOSPITAL | Age: 59
End: 2024-12-20
Payer: COMMERCIAL

## 2024-12-20 VITALS
WEIGHT: 160 LBS | HEIGHT: 67 IN | RESPIRATION RATE: 18 BRPM | BODY MASS INDEX: 25.11 KG/M2 | HEART RATE: 85 BPM | TEMPERATURE: 98.6 F | DIASTOLIC BLOOD PRESSURE: 94 MMHG | OXYGEN SATURATION: 97 % | SYSTOLIC BLOOD PRESSURE: 172 MMHG

## 2024-12-20 LAB
ALBUMIN SERPL BCP-MCNC: 4.3 G/DL (ref 3.4–5)
ALP SERPL-CCNC: 88 U/L (ref 33–136)
ALT SERPL W P-5'-P-CCNC: 13 U/L (ref 10–52)
ANION GAP SERPL CALC-SCNC: 14 MMOL/L (ref 10–20)
AST SERPL W P-5'-P-CCNC: 26 U/L (ref 9–39)
BASOPHILS # BLD AUTO: 0.04 X10*3/UL (ref 0–0.1)
BASOPHILS NFR BLD AUTO: 0.6 %
BILIRUB SERPL-MCNC: 0.9 MG/DL (ref 0–1.2)
BUN SERPL-MCNC: 17 MG/DL (ref 6–23)
CALCIUM SERPL-MCNC: 9.2 MG/DL (ref 8.6–10.6)
CARDIAC TROPONIN I PNL SERPL HS: 10 NG/L (ref 0–53)
CHLORIDE SERPL-SCNC: 103 MMOL/L (ref 98–107)
CO2 SERPL-SCNC: 27 MMOL/L (ref 21–32)
CREAT SERPL-MCNC: 0.83 MG/DL (ref 0.5–1.3)
EGFRCR SERPLBLD CKD-EPI 2021: >90 ML/MIN/1.73M*2
EOSINOPHIL # BLD AUTO: 0.04 X10*3/UL (ref 0–0.7)
EOSINOPHIL NFR BLD AUTO: 0.6 %
ERYTHROCYTE [DISTWIDTH] IN BLOOD BY AUTOMATED COUNT: 12 % (ref 11.5–14.5)
GLUCOSE SERPL-MCNC: 79 MG/DL (ref 74–99)
HCT VFR BLD AUTO: 41.4 % (ref 41–52)
HGB BLD-MCNC: 14 G/DL (ref 13.5–17.5)
IMM GRANULOCYTES # BLD AUTO: 0.02 X10*3/UL (ref 0–0.7)
IMM GRANULOCYTES NFR BLD AUTO: 0.3 % (ref 0–0.9)
LYMPHOCYTES # BLD AUTO: 1.45 X10*3/UL (ref 1.2–4.8)
LYMPHOCYTES NFR BLD AUTO: 22.2 %
MCH RBC QN AUTO: 33.1 PG (ref 26–34)
MCHC RBC AUTO-ENTMCNC: 33.8 G/DL (ref 32–36)
MCV RBC AUTO: 98 FL (ref 80–100)
MONOCYTES # BLD AUTO: 0.46 X10*3/UL (ref 0.1–1)
MONOCYTES NFR BLD AUTO: 7 %
NEUTROPHILS # BLD AUTO: 4.53 X10*3/UL (ref 1.2–7.7)
NEUTROPHILS NFR BLD AUTO: 69.3 %
NRBC BLD-RTO: 0 /100 WBCS (ref 0–0)
PLATELET # BLD AUTO: 68 X10*3/UL (ref 150–450)
POTASSIUM SERPL-SCNC: 4.1 MMOL/L (ref 3.5–5.3)
PROT SERPL-MCNC: 7.7 G/DL (ref 6.4–8.2)
RBC # BLD AUTO: 4.23 X10*6/UL (ref 4.5–5.9)
SODIUM SERPL-SCNC: 140 MMOL/L (ref 136–145)
WBC # BLD AUTO: 6.5 X10*3/UL (ref 4.4–11.3)

## 2024-12-20 PROCEDURE — 85025 COMPLETE CBC W/AUTO DIFF WBC: CPT | Performed by: EMERGENCY MEDICINE

## 2024-12-20 PROCEDURE — 99281 EMR DPT VST MAYX REQ PHY/QHP: CPT | Mod: 25

## 2024-12-20 PROCEDURE — 93005 ELECTROCARDIOGRAM TRACING: CPT

## 2024-12-20 PROCEDURE — 84075 ASSAY ALKALINE PHOSPHATASE: CPT | Performed by: EMERGENCY MEDICINE

## 2024-12-20 PROCEDURE — 36415 COLL VENOUS BLD VENIPUNCTURE: CPT | Performed by: EMERGENCY MEDICINE

## 2024-12-20 PROCEDURE — 99285 EMERGENCY DEPT VISIT HI MDM: CPT | Mod: 25

## 2024-12-20 PROCEDURE — 71046 X-RAY EXAM CHEST 2 VIEWS: CPT | Performed by: STUDENT IN AN ORGANIZED HEALTH CARE EDUCATION/TRAINING PROGRAM

## 2024-12-20 PROCEDURE — 84484 ASSAY OF TROPONIN QUANT: CPT | Performed by: EMERGENCY MEDICINE

## 2024-12-20 PROCEDURE — 71046 X-RAY EXAM CHEST 2 VIEWS: CPT

## 2024-12-20 ASSESSMENT — PAIN DESCRIPTION - DESCRIPTORS: DESCRIPTORS: SHARP

## 2024-12-20 ASSESSMENT — PAIN - FUNCTIONAL ASSESSMENT: PAIN_FUNCTIONAL_ASSESSMENT: 0-10

## 2024-12-20 ASSESSMENT — PAIN SCALES - GENERAL: PAINLEVEL_OUTOF10: 10 - WORST POSSIBLE PAIN

## 2024-12-20 ASSESSMENT — PAIN DESCRIPTION - PAIN TYPE: TYPE: ACUTE PAIN

## 2024-12-20 ASSESSMENT — PAIN DESCRIPTION - LOCATION: LOCATION: CHEST

## 2024-12-21 LAB
ATRIAL RATE: 83 BPM
P AXIS: 68 DEGREES
P OFFSET: 197 MS
P ONSET: 147 MS
PR INTERVAL: 150 MS
Q ONSET: 222 MS
QRS COUNT: 14 BEATS
QRS DURATION: 82 MS
QT INTERVAL: 380 MS
QTC CALCULATION(BAZETT): 446 MS
QTC FREDERICIA: 423 MS
R AXIS: 23 DEGREES
T AXIS: -42 DEGREES
T OFFSET: 412 MS
VENTRICULAR RATE: 83 BPM
